# Patient Record
Sex: FEMALE | Race: BLACK OR AFRICAN AMERICAN | Employment: OTHER | URBAN - METROPOLITAN AREA
[De-identification: names, ages, dates, MRNs, and addresses within clinical notes are randomized per-mention and may not be internally consistent; named-entity substitution may affect disease eponyms.]

---

## 2021-12-31 ENCOUNTER — APPOINTMENT (OUTPATIENT)
Dept: GENERAL RADIOLOGY | Age: 76
End: 2021-12-31
Payer: COMMERCIAL

## 2021-12-31 ENCOUNTER — HOSPITAL ENCOUNTER (EMERGENCY)
Age: 76
Discharge: HOME | End: 2021-12-31
Attending: EMERGENCY MEDICINE
Payer: COMMERCIAL

## 2021-12-31 VITALS
SYSTOLIC BLOOD PRESSURE: 143 MMHG | HEART RATE: 65 BPM | OXYGEN SATURATION: 96 % | RESPIRATION RATE: 16 BRPM | DIASTOLIC BLOOD PRESSURE: 87 MMHG | WEIGHT: 180 LBS | BODY MASS INDEX: 31.89 KG/M2 | HEIGHT: 63 IN | TEMPERATURE: 100 F

## 2021-12-31 DIAGNOSIS — U07.1 COVID-19: Primary | ICD-10-CM

## 2021-12-31 DIAGNOSIS — J02.9 ACUTE PHARYNGITIS, UNSPECIFIED ETIOLOGY: ICD-10-CM

## 2021-12-31 LAB
RAPID INFLUENZA  B AGN: NEGATIVE
RAPID INFLUENZA A AGN: NEGATIVE
S PYO AG THROAT QL: NEGATIVE
SARS-COV-2, NAAT: DETECTED

## 2021-12-31 PROCEDURE — 71045 X-RAY EXAM CHEST 1 VIEW: CPT

## 2021-12-31 PROCEDURE — 87880 STREP A ASSAY W/OPTIC: CPT

## 2021-12-31 PROCEDURE — 87081 CULTURE SCREEN ONLY: CPT

## 2021-12-31 PROCEDURE — 99283 EMERGENCY DEPT VISIT LOW MDM: CPT

## 2021-12-31 PROCEDURE — 6370000000 HC RX 637 (ALT 250 FOR IP): Performed by: PHYSICIAN ASSISTANT

## 2021-12-31 PROCEDURE — 87804 INFLUENZA ASSAY W/OPTIC: CPT

## 2021-12-31 PROCEDURE — 87635 SARS-COV-2 COVID-19 AMP PRB: CPT

## 2021-12-31 RX ORDER — LIDOCAINE HYDROCHLORIDE 20 MG/ML
15 SOLUTION OROPHARYNGEAL ONCE
Status: DISCONTINUED | OUTPATIENT
Start: 2021-12-31 | End: 2021-12-31 | Stop reason: HOSPADM

## 2021-12-31 RX ADMIN — BENZOCAINE AND MENTHOL 1 LOZENGE: 15; 3.6 LOZENGE ORAL at 11:30

## 2021-12-31 ASSESSMENT — PAIN DESCRIPTION - PAIN TYPE: TYPE: ACUTE PAIN

## 2021-12-31 ASSESSMENT — PAIN SCALES - GENERAL: PAINLEVEL_OUTOF10: 3

## 2021-12-31 NOTE — ED PROVIDER NOTES
[address] [facility] [address] ENCOUNTER          PHYSICIAN ASSISTANT NOTE       Date of evaluation: 12/31/2021    Chief Complaint     Pharyngitis (For the last 3 days.)    History of Present Illness     Enmanuel Anderson Saturday is a 64 y.o. female with a past medical history of diabetes, hypertension and heart disease presenting to the emergency department for evaluation of sore throat, hoarse voice as well as cough that is productive of thick, clear sputum. Symptoms started about 3 days ago. 5 days ago she received her second dose of her Covid vaccine. After receiving her vaccine she had a low-grade fever for about 1 or 2 days which is since subsided. She has had no difficulty swallowing or maintaining her secretions. She does not smoke, no history of asthma or COPD with no chest pain or shortness of breath. She does not believe that she has had any known ill contacts. Review of Systems     Review of Systems   Constitutional: Negative for chills, diaphoresis, fatigue and fever. HENT: Positive for sore throat and voice change. Negative for congestion, ear pain, postnasal drip, sinus pressure, sinus pain and trouble swallowing. Eyes: Negative for visual disturbance. Respiratory: Negative for chest tightness and shortness of breath. Cardiovascular: Negative for chest pain, palpitations and leg swelling. Genitourinary: Negative for dysuria and frequency. Musculoskeletal: Negative for back pain and myalgias. Neurological: Negative for dizziness, weakness and headaches. Psychiatric/Behavioral: Negative for confusion. Past Medical, Surgical, Family, and Social History     She has a past medical history of Diabetes mellitus ([address] Heart disease, and Hypertension. She has no past surgical history on file. Her family history is not on file. She reports that she has never smoked.  She has never used smokeless tobacco. She reports that she does not drink alcohol and does not use drugs.    Medications     Discharge Medication List as of 12/31/2021  1:24 PM      CONTINUE these medications which have NOT CHANGED    Details   acetaminophen (AMINOFEN) 325 MG tablet Take 2 tablets by mouth every 6 hours as needed for Pain, Disp-60 tablet, R-0Print             Allergies     She is allergic to apap [acetaminophen], hydrocodone, metoprolol, and naproxen. Physical Exam     INITIAL VITALS: BP: (!) 143/87, Temp: 100 °F (37.8 °C), Pulse: 65, Resp: 16, SpO2: 96 %  Physical Exam  Vitals and nursing note reviewed. Constitutional:       General: She is not in acute distress. Appearance: She is well-developed and normal weight. She is not ill-appearing, toxic-appearing or diaphoretic. HENT:      Nose: No congestion or rhinorrhea. Mouth/Throat:      Mouth: Mucous membranes are moist. No oral lesions. Pharynx: No pharyngeal swelling, oropharyngeal exudate, posterior oropharyngeal erythema or uvula swelling. Tonsils: No tonsillar exudate or tonsillar abscesses. Eyes:      Conjunctiva/sclera: Conjunctivae normal.   Cardiovascular:      Rate and Rhythm: Normal rate and regular rhythm. Heart sounds: Normal heart sounds. No murmur heard. No friction rub. No gallop. Pulmonary:      Effort: Pulmonary effort is normal. No respiratory distress. Breath sounds: Normal breath sounds. Abdominal:      Palpations: Abdomen is soft. Tenderness: There is no abdominal tenderness. Musculoskeletal:      Cervical back: Normal range of motion. Lymphadenopathy:      Cervical: No cervical adenopathy. Skin:     General: Skin is warm and dry. Capillary Refill: Capillary refill takes less than 2 seconds. Neurological:      Mental Status: She is alert.    Psychiatric:         Mood and Affect: Mood normal.         Behavior: Behavior normal.         Diagnostic Results     RADIOLOGY:  XR CHEST PORTABLE   Final Result      Parenchymal band at the left lung base consistent with atelectasis or scarring. LABS:   Results for orders placed or performed during the hospital encounter of 12/31/21   COVID-19, Rapid    Specimen: Nasopharyngeal Swab   Result Value Ref Range    SARS-CoV-2, NAAT DETECTED (AA) Not Detected   Strep Screen Group A Throat    Specimen: Throat   Result Value Ref Range    Rapid Strep A Screen Negative Negative   Rapid influenza A/B antigens    Specimen: Nasopharyngeal   Result Value Ref Range    Rapid Influenza A Ag Negative Negative    Rapid Influenza B Ag Negative Negative   Culture, Beta Strep Confirm Plates    Specimen: Throat   Result Value Ref Range    Strep A Culture Normal oral vale, No Beta Strep isolated        ED BEDSIDE ULTRASOUND:  None    RECENT VITALS:  BP: (!) 143/87, Temp: 100 °F (37.8 °C), Pulse: 65, Resp: 16, SpO2: 96 %     Procedures   None    ED Course     Nursing Notes, Past Medical Hx,Past Surgical Hx, Social Hx, Allergies, and Family Hx were reviewed. The patient was given the following medications:  Orders Placed This Encounter   Medications    benzocaine-menthol (CEPACOL SORE THROAT) lozenge 1 lozenge    DISCONTD: lidocaine viscous hcl (XYLOCAINE) 2 % solution 15 mL    menthol-cetylpyridinium (CEPACOL REGULAR STRENGTH) 3 MG lozenge     Sig: Take 1 lozenge by mouth every 6-8 hours as needed for Sore Throat     Dispense:  20 lozenge     Refill:  0       CONSULTS:  None    MEDICAL DECISION MAKING / ASSESSMENT / Isamar Selina Saturday is a 64 y.o. female presenting to the emergency department for evaluation of sore throat and hoarse voice. Symptoms started shortly after receiving Covid vaccine earlier this week. Upon presentation she was well in appearance, seated on the stretcher in no acute distress. She had a fever of 100 °F, vitals were otherwise stable with oxygen saturations between 96 to 98% on room air. No oxygen desaturation with walk test.  Breath sounds equal and unlabored with no wheezing or rhonchi present.     Strep test was negative. She had no tonsillar exudate, or asymmetry, maintaining secretions appropriately with no findings concerning for deep space abscess on physical exam.  Chest x-ray showed no acute cardiopulmonary process, specifically no consolidation. Flu test was also negative. Covid test resulted and positive. She was given Tylenol as well as Cepacol lozenges for symptom control, and on repeat examination was feeling better. With no associated respiratory distress, no abnormal vitals, feel that she is appropriate for discharge home with strict return precautions and quarantine instructions. She agreed to call her PCP to discuss further management. This patient was also evaluated by the attending physician. All care plans were discussed and agreed upon. Clinical Impression     1. COVID-19    2. Acute pharyngitis, unspecified etiology        Disposition     PATIENT REFERRED TO:  No follow-up provider specified.     DISCHARGE MEDICATIONS:  Discharge Medication List as of 12/31/2021  1:24 PM      START taking these medications    Details   menthol-cetylpyridinium (CEPACOL REGULAR STRENGTH) 3 MG lozenge Take 1 lozenge by mouth every 6-8 hours as needed for Sore Throat, Disp-20 lozenge, R-0Print             DISPOSITION Decision To Discharge 12/31/2021 01:14:29 PM       Ivania Armas PA-C  01/05/22 1044       Barb Armstrong PA-C  01/05/22 1059

## 2021-12-31 NOTE — ED NOTES
Patient \"walked in place\" at [facility] for 2 minutes. RR elevated to 32-36 but patient denied need to rest, Sa02 stayed above 95% for duration and HR went up to 106. Patient RR and HR returned to baseline shortly after returning to bed.   Barb GARCIA notifed     Nevaeh Swanson RN  12/31/21 [id number]

## 2021-12-31 NOTE — ED NOTES
Bed: B24-24  Expected date:   Expected time:   Means of arrival:   Comments:  Lobby saturday     Frank Rodriguez RN  12/31/21 [id number]

## 2022-01-02 LAB — S PYO THROAT QL CULT: NORMAL

## 2022-01-03 ENCOUNTER — CARE COORDINATION (OUTPATIENT)
Dept: CARE COORDINATION | Age: 77
End: 2022-01-03

## 2022-01-04 ENCOUNTER — CARE COORDINATION (OUTPATIENT)
Dept: CARE COORDINATION | Age: 77
End: 2022-01-04

## 2022-01-05 NOTE — CARE COORDINATION
Patient contacted regarding COVID-19 diagnosis. Discussed COVID-19 related testing which was available at this time. Test results were positive. Patient informed of results, if available? Yes. Ambulatory Care Manager contacted the patient by telephone to perform post discharge assessment. Call within 2 business days of discharge: Yes. Verified name and  with patient as identifiers. Provided introduction to self, and explanation of the CTN/ACM role, and reason for call due to risk factors for infection and/or exposure to COVID-19. Symptoms reviewed with patient who verbalized the following symptoms: fever, chills or shaking and sore throat. .      Due to no new or worsening symptoms encounter was not routed to provider for escalation. Discussed follow-up appointments. If no appointment was previously scheduled, appointment scheduling offered: Yes. [facility] follow up appointment(s): No future appointments. Non-[facility] follow up appointment(s): none. Non-face-to-face services provided:  Obtained and reviewed discharge summary and/or continuity of care documents  Education of patient/family/caregiver/guardian to support self-management-for COVID 19. Advance Care Planning:   Does patient have an Advance Directive:  not on file; education provided. Educated patient about risk for severe COVID-19 due to risk factors according to CDC guidelines. ACM reviewed discharge instructions, medical action plan and red flag symptoms with the patient who verbalized understanding. Discussed COVID vaccination status: Yes. Education provided on COVID-19 vaccination as appropriate. Discussed exposure protocols and quarantine with CDC Guidelines. Patient was given an opportunity to verbalize any questions and concerns and agrees to contact ACM or health care provider for questions related to their healthcare.     Reviewed and educated patient on any new and changed medications related to discharge diagnosis     Was patient discharged with a pulse oximeter? No Discussed and confirmed pulse oximeter discharge instructions and when to notify provider or seek emergency care. ACM provided contact information. Plan for follow-up call in 5-7 days based on severity of symptoms and risk factors.

## 2025-05-17 ENCOUNTER — APPOINTMENT (OUTPATIENT)
Dept: CT IMAGING | Age: 80
End: 2025-05-17
Payer: MEDICARE

## 2025-05-17 ENCOUNTER — HOSPITAL ENCOUNTER (EMERGENCY)
Age: 80
Discharge: HOME | End: 2025-05-17
Attending: EMERGENCY MEDICINE
Payer: MEDICARE

## 2025-05-17 ENCOUNTER — APPOINTMENT (OUTPATIENT)
Dept: GENERAL RADIOLOGY | Age: 80
End: 2025-05-17
Payer: MEDICARE

## 2025-05-17 VITALS
HEIGHT: 63 IN | TEMPERATURE: 98.3 F | RESPIRATION RATE: 16 BRPM | BODY MASS INDEX: 31.4 KG/M2 | SYSTOLIC BLOOD PRESSURE: 180 MMHG | DIASTOLIC BLOOD PRESSURE: 81 MMHG | WEIGHT: 177.2 LBS | HEART RATE: 63 BPM | OXYGEN SATURATION: 95 %
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
## 2025-05-17 DIAGNOSIS — S00.83XA CONTUSION OF FACE, INITIAL ENCOUNTER: ICD-10-CM

## 2025-05-17 PROCEDURE — 99284 EMERGENCY DEPT VISIT MOD MDM: CPT

## 2025-05-17 PROCEDURE — 90715 TDAP VACCINE 7 YRS/> IM: CPT

## 2025-05-17 PROCEDURE — 6370000000 HC RX 637 (ALT 250 FOR IP)

## 2025-05-17 PROCEDURE — 70450 CT HEAD/BRAIN W/O DYE: CPT

## 2025-05-17 PROCEDURE — 6360000002 HC RX W HCPCS

## 2025-05-17 PROCEDURE — 90471 IMMUNIZATION ADMIN: CPT

## 2025-05-17 PROCEDURE — 73562 X-RAY EXAM OF KNEE 3: CPT

## 2025-05-17 RX ORDER — ACETAMINOPHEN 500 MG
1000 TABLET ORAL
Status: COMPLETED | OUTPATIENT
Start: 2025-05-17 | End: 2025-05-17

## 2025-05-17 RX ADMIN — ACETAMINOPHEN 1000 MG: 500 TABLET ORAL at 15:29

## 2025-05-17 RX ADMIN — TETANUS TOXOID, REDUCED DIPHTHERIA TOXOID AND ACELLULAR PERTUSSIS VACCINE, ADSORBED 0.5 ML: 5; 2.5; 8; 8; 2.5 SUSPENSION INTRAMUSCULAR at 15:29

## 2025-05-17 ASSESSMENT — PAIN SCALES - GENERAL
PAINLEVEL_OUTOF10: 6
PAINLEVEL_OUTOF10: 6

## 2025-05-17 ASSESSMENT — PAIN DESCRIPTION - ORIENTATION: ORIENTATION: RIGHT

## 2025-05-17 ASSESSMENT — PAIN DESCRIPTION - LOCATION
LOCATION: FACE
LOCATION: KNEE;FACE

## 2025-05-17 ASSESSMENT — PAIN - FUNCTIONAL ASSESSMENT
PAIN_FUNCTIONAL_ASSESSMENT: ACTIVITIES ARE NOT PREVENTED
PAIN_FUNCTIONAL_ASSESSMENT: 0-10

## 2025-05-17 ASSESSMENT — PAIN DESCRIPTION - PAIN TYPE: TYPE: ACUTE PAIN

## 2025-05-17 ASSESSMENT — PAIN DESCRIPTION - DESCRIPTORS: DESCRIPTORS: ACHING

## 2025-05-17 ASSESSMENT — PAIN DESCRIPTION - FREQUENCY: FREQUENCY: CONTINUOUS

## 2025-05-17 NOTE — ED PROVIDER NOTES
THE [facility]  EMERGENCY DEPARTMENT ENCOUNTER          EM RESIDENT NOTE     Date of Evaluation: 5/17/2025    Chief Complaint     Fall (Pt reports fall \"I missed a step\" on concrete outside. Pain in face and right knee )    History of Present Illness     Keke Saturday is a 80 y.o. female with PMHx as below who presents with fall.  States that she was walking up a flight of stairs.  Missed a step, fell forward, striking her face on the ground.  Reports pain in the face, right knee.  Denies loss of consciousness and is not anticoagulated.  Denies changes in vision, pain in the eyes, malocclusion of the jaw, pain elsewhere, or other symptoms.    Review of Systems     ROS: A comprehensive review of systems was performed and negative except as noted previously in the HPI.     Past Medical, Surgical, Family, and Social History     She has a past medical history of Diabetes mellitus (HCC), Heart disease, and Hypertension.  She has no past surgical history on file.  Herfamily history is not on file.  She reports that she has never smoked. She has never used smokeless tobacco. She reports that she does not drink alcohol and does not use drugs.  Medications     Discharge Medication List as of 5/17/2025  3:32 PM        CONTINUE these medications which have NOT CHANGED    Details   menthol-cetylpyridinium (CEPACOL REGULAR STRENGTH) 3 MG lozenge Take 1 lozenge by mouth every 6-8 hours as needed for Sore Throat, Disp-20 lozenge, R-0Print      acetaminophen (AMINOFEN) 325 MG tablet Take 2 tablets by mouth every 6 hours as needed for Pain, Disp-60 tablet, R-0Print             Allergies     She is allergic to apap [acetaminophen], hydrocodone, metoprolol, and naproxen.    Physical Exam     INITIAL VITALS:   BP: (!) 196/103, Temp: 98.3 °F (36.8 °C), Pulse: 63, Respirations: 16, SpO2: 95 %     General: 80 y.o. female, well-appearing; in no apparent distress.  Head: Normocephalic.  Small abrasion over the bridge of the nose  Hx, Past Surgical Hx, Social Hx, Allergies, and Family Hx were reviewed.    PATIENT GIVEN FOLLOWING MEDICATIONS:  Orders Placed This Encounter   Medications    tetanus-diphth-acell pertussis (BOOSTRIX) injection 0.5 mL    acetaminophen (TYLENOL) tablet 1,000 mg     CONSULTS:  None    MEDICAL DECISION MAKING / ASSESSMENT / PLAN     INITIAL VITALS: BP: (!) 196/103, Temp: 98.3 °F (36.8 °C), Pulse: 63, Respirations: 16, SpO2: 95 %    Keke Saturday is a 80 y.o. female who presents with fall. Upon presentation, the patient was well-appearing; afebrile, and hemodynamically stable.    Here after a fall, with exam notable for nasal and forehead swelling, hematoma, small right knee abrasion.  Nonfocal neurologic exam.  Given her age, CT imaging of the head was obtained as well as x-ray imaging of the knee.  Given that this was a clearly reported mechanical fall without reported preceding symptoms or presyncopal symptoms, I do not feel that further evaluation with laboratory studies is required here today.    CT imaging was notable for small subgaleal hematoma, otherwise without acute intracranial findings.  X-ray imaging of the knee was without acute findings.  She was treated with a tetanus shot as well as with Tylenol which improved her symptoms.  On reassessment, she remained with a normal, nonfocal neurologic exam and remained with normal vital signs aside from hypertension.  She was able ambulate without assistance.  And stated that she felt well enough to return home.  I discussed with her that she should follow-up with her primary care doctor, and to return for new or worsening symptoms such as worsening headache, vomiting, weakness or any other new symptoms.  She understood and agreed to this plan    Is this patient to be included in the SEP-1 core measure? No Exclusion criteria - the patient is NOT to be included for SEP-1 Core Measure due to: Infection is not suspected       Medical Decision Making  Amount and/or

## 2025-05-17 NOTE — ED NOTES
Pt verbalizes understanding of discharge instructions. Pt discharged with copy of AVS. Pt ambulatory with steady gait at discharge. Pt discharged home with daughter      Jabari Corona RN  05/17/25 [id number]

## 2025-05-17 NOTE — DISCHARGE INSTRUCTIONS
You were seen in the ER today for a fall.  Your evaluation here with an exam and a CT scan was reassuring and no abnormalities were seen.  Please follow-up with your primary care doctor within 1 week to ensure that your symptoms are not worsening.  In the meantime, if you have any new or worsening symptoms such as loss of consciousness, worsening headache, nausea or vomiting, or any other new symptoms, return to the ER

## 2025-05-17 NOTE — ED PROVIDER NOTES
ED Attending Attestation Note     Date of evaluation: 5/17/2025    This patient was seen by the resident.  I have seen and examined the patient, agree with the workup, evaluation, management and diagnosis. The care plan has been discussed.  My assessment reveals ***.    Critical Care:  Due to the immediate potential for life-threatening deterioration due to ***, I spent *** minutes providing critical care.  This time excludes time spent performing procedures but includes time spent on direct patient care, history retrieval, review of the chart, and discussions with patient, family, and consultant(s).